# Patient Record
Sex: MALE | Race: WHITE | NOT HISPANIC OR LATINO | Employment: STUDENT | URBAN - METROPOLITAN AREA
[De-identification: names, ages, dates, MRNs, and addresses within clinical notes are randomized per-mention and may not be internally consistent; named-entity substitution may affect disease eponyms.]

---
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
## 2024-06-15 ENCOUNTER — HOSPITAL ENCOUNTER (EMERGENCY)
Facility: HOSPITAL | Age: 19
Discharge: HOME | End: 2024-06-15
Attending: EMERGENCY MEDICINE
Payer: COMMERCIAL

## 2024-06-15 VITALS
BODY MASS INDEX: 24.11 KG/M2 | HEART RATE: 96 BPM | SYSTOLIC BLOOD PRESSURE: 133 MMHG | TEMPERATURE: 98 F | RESPIRATION RATE: 16 BRPM | WEIGHT: 181.88 LBS | OXYGEN SATURATION: 99 % | HEIGHT: 73 IN | DIASTOLIC BLOOD PRESSURE: 85 MMHG

## 2024-06-15 DIAGNOSIS — Z23 NEED FOR TD VACCINE: ICD-10-CM

## 2024-06-15 DIAGNOSIS — S61.011A LACERATION OF RIGHT THUMB WITHOUT FOREIGN BODY WITHOUT DAMAGE TO NAIL, INITIAL ENCOUNTER: Primary | ICD-10-CM

## 2024-06-15 PROCEDURE — 12002 RPR S/N/AX/GEN/TRNK2.6-7.5CM: CPT

## 2024-06-15 PROCEDURE — 90715 TDAP VACCINE 7 YRS/> IM: CPT | Performed by: PHYSICIAN ASSISTANT

## 2024-06-15 PROCEDURE — 99284 EMERGENCY DEPT VISIT MOD MDM: CPT | Mod: 25

## 2024-06-15 PROCEDURE — 25000003 PHARM REV CODE 250: Performed by: PHYSICIAN ASSISTANT

## 2024-06-15 PROCEDURE — 90471 IMMUNIZATION ADMIN: CPT | Performed by: PHYSICIAN ASSISTANT

## 2024-06-15 PROCEDURE — 63600175 PHARM REV CODE 636 W HCPCS: Performed by: PHYSICIAN ASSISTANT

## 2024-06-15 RX ORDER — LIDOCAINE HYDROCHLORIDE 10 MG/ML
10 INJECTION INFILTRATION; PERINEURAL
Status: COMPLETED | OUTPATIENT
Start: 2024-06-15 | End: 2024-06-15

## 2024-06-15 RX ADMIN — LIDOCAINE HYDROCHLORIDE 10 ML: 10 INJECTION, SOLUTION INFILTRATION; PERINEURAL at 01:06

## 2024-06-15 RX ADMIN — BACITRACIN ZINC, NEOMYCIN, POLYMYXIN B 1 EACH: 400; 3.5; 5 OINTMENT TOPICAL at 02:06

## 2024-06-15 RX ADMIN — TETANUS TOXOID, REDUCED DIPHTHERIA TOXOID AND ACELLULAR PERTUSSIS VACCINE, ADSORBED 0.5 ML: 5; 2.5; 8; 8; 2.5 SUSPENSION INTRAMUSCULAR at 01:06

## 2024-06-15 NOTE — ED PROVIDER NOTES
Encounter Date: 6/15/2024       History     Chief Complaint   Patient presents with    thumb laceration     The patient is a 17 yo M. Hx of ADHD. Left hand dominant. He presented to the ER for an urgent evaluation c/o an acute laceration of his right thumb. He states that he broke a glass keya nac inadvertently causing the laceration. The injury occurred just prior to arrival. He denies any suspected retained glass FB. He denies any numbness or decreased ROM. He is accompanied by his mother who reports that he has not had a TD vaccine in more than 5 years. She states that she gave him 2 tablets of Tylenol for pain just PTA. No additional symptoms or concerns reported.        Review of patient's allergies indicates:  No Known Allergies  Past Medical History:   Diagnosis Date    ADHD      History reviewed. No pertinent surgical history.  No family history on file.  Social History     Tobacco Use    Smoking status: Never    Smokeless tobacco: Never   Substance Use Topics    Alcohol use: Never    Drug use: Never     Review of Systems   Respiratory:  Negative for shortness of breath.    Cardiovascular:  Negative for chest pain.   Gastrointestinal:  Negative for abdominal pain and nausea.   Musculoskeletal:  Negative for arthralgias and joint swelling.   Skin:  Positive for wound.   Allergic/Immunologic: Negative for immunocompromised state.   Neurological:  Negative for syncope, weakness and numbness.   Psychiatric/Behavioral:  The patient is nervous/anxious.        Physical Exam     Initial Vitals [06/15/24 1228]   BP Pulse Resp Temp SpO2   133/85 106 16 97.7 °F (36.5 °C) 98 %      MAP       --         Physical Exam    Nursing note and vitals reviewed.  Constitutional: He appears well-developed and well-nourished.   HENT:   Head: Atraumatic.   Eyes: Conjunctivae are normal.   Cardiovascular:  Normal rate and intact distal pulses.           Normal cap refill    Pulmonary/Chest: No respiratory distress.   Abdominal: He  "exhibits no distension.   Musculoskeletal:         General: Normal range of motion.      Comments: Acute superficial "V" shaped laceration to the volar aspect of the right thumb measuring approximately 4 cm in total length; no FB identified; normal ROM observed - doubt tendon injury or fracture; NV intact distal to wound. See image.      Neurological: He is alert and oriented to person, place, and time. He has normal strength. No sensory deficit. GCS score is 15. GCS eye subscore is 4. GCS verbal subscore is 5. GCS motor subscore is 6.   Skin: Skin is warm and dry.   Psychiatric: He has a normal mood and affect. His behavior is normal.   Nervous/anxious         ED Course   Lac Repair    Date/Time: 6/15/2024 1:32 PM    Performed by: Royal Li PA-C  Authorized by: Agus Benoit MD    Consent:     Consent obtained:  Verbal    Consent given by:  Patient and parent    Risks discussed:  Infection, need for additional repair, nerve damage, poor wound healing, poor cosmetic result, pain, retained foreign body, tendon damage and vascular damage    Alternatives discussed:  No treatment and referral  Anesthesia:     Anesthesia method:  Local infiltration    Local anesthetic:  Lidocaine 1% w/o epi  Laceration details:     Location:  Finger    Finger location:  R thumb    Length (cm):  4  Pre-procedure details:     Preparation:  Patient was prepped and draped in usual sterile fashion and imaging obtained to evaluate for foreign bodies  Exploration:     Imaging obtained: x-ray      Imaging outcome: foreign body not noted      Wound exploration: wound explored through full range of motion and entire depth of wound visualized      Wound extent: no foreign bodies/material noted, no muscle damage noted, no tendon damage noted, no underlying fracture noted and no vascular damage noted      Contaminated: no    Treatment:     Area cleansed with:  Povidone-iodine and saline    Amount of cleaning:  Extensive    " Irrigation solution:  Sterile saline  Skin repair:     Repair method:  Sutures    Suture size:  4-0    Suture material:  Nylon    Suture technique:  Simple interrupted and running    Number of sutures:  7  Approximation:     Approximation:  Close  Repair type:     Repair type:  Intermediate  Post-procedure details:     Dressing:  Antibiotic ointment and bulky dressing    Procedure completion:  Tolerated well, no immediate complications    Labs Reviewed - No data to display       Imaging Results              X-Ray Finger 2 or More Views Right (Final result)  Result time 06/15/24 14:58:40      Final result by Rodolfo Isaac MD (06/15/24 14:58:40)                   Impression:      No convincing evidence of acute fracture or dislocation.    No radiopaque foreign body.      Electronically signed by: Rodolfo Isaac  Date:    06/15/2024  Time:    14:58               Narrative:    EXAMINATION:  XR FINGER 2 OR MORE VIEWS RIGHT    CLINICAL HISTORY:  right thumb laceration;    TECHNIQUE:  Three views right thumb.    COMPARISON:  None    FINDINGS:  Presumed overlying bandage material somewhat limits evaluation of fine bony detail.    Noting this, no convincing evidence of acute displaced fracture or dislocation is seen.  Joint spaces appear maintained.    No definite radiopaque foreign body identified.    Remainder examination negative for acute change.                                       Medications   LIDOcaine HCL 10 mg/ml (1%) injection 10 mL (10 mLs Infiltration Given by Provider 6/15/24 1353)   neomycin-bacitracnZn-polymyxnB packet (1 each Topical (Top) Given 6/15/24 1405)   Tdap (BOOSTRIX) vaccine injection 0.5 mL (0.5 mLs Intramuscular Given 6/15/24 1350)     Medical Decision Making  Amount and/or Complexity of Data Reviewed  Radiology: ordered.    Risk  OTC drugs.  Prescription drug management.      Additional MDM:     X-Rays: Right thumb x ray was completed. My preliminary wet read - no fracture, dislocation,  "or FB identified.                       Medical Decision Making:   History:   I obtained history from: someone other than patient.       <> Summary of History: History obtained from the patient and his mother   Old Medical Records: I decided to obtain old medical records.  Old Records Summarized: other records.       <> Summary of Records: Chart review completed - outside immunization records verified, last TD in 2007   Initial Assessment:   19 yo M here for an acute laceration of his right thumb, "V" Shaped, approximately 4 cm in total length; NVI distal to wound, normal ROM w/o tendon injury   Differential Diagnosis:   Laceration, infection, NV injury, tendon injury, FB, etc   Clinical Tests:   Radiological Study: Ordered and Reviewed  ED Management:  Vital signs reviewed  Pt had Tylenol for pain just PTA   Td vaccine updated   Wound irrigated and explored - no FB   X ray completed - no Fx or FB identified   Wound cleaned with antiseptic   Wound repaired with sutures   Wound dressed with triple antibiotic ointment, sterile gauze 4 x 4, and loosely wrapped Coban - not too tight, pink warm fingertip with normal cap refill after coban dressing applied - cautioned regarding restrictive dressing   Wound care instructions provided - pt's mother is an RN at [facility] - comfortable with wound care   Tylenol/Ibuprofen advised for any pain   Follow up and return instructions provided                Clinical Impression:  Final diagnoses:  [S61.011A] Laceration of right thumb without foreign body without damage to nail, initial encounter (Primary)  [Z23] Need for Td vaccine          ED Disposition Condition    Discharge Stable          ED Prescriptions    None       Follow-up Information       Follow up With Specialties Details Why Contact Info    Adelaide Roy MD Pediatrics Schedule an appointment as soon as possible for a visit in 2 days For wound re-check [address]  [place] " [id number]  [phone]      [facility] Dept Emergency Medicine  If symptoms worsen [address]  [facility] [id number] [phone]             Royal Li, DAVID  06/15/24 1531       Royal Li PA-C  06/15/24 1539

## 2024-06-15 NOTE — ED NOTES
LOC: The patient is awake, alert and aware of environment with an appropriate affect  APPEARANCE: Patient resting comfortably and in no acute distress.  SKIN: The skin is warm and dry,with normal color.  RESPIRATORY: Airway is open and patent, respirations are spontaneous, patient has a normal effort and rate.ABDOMEN: Soft and non tender to palpation, no distention noted.  NEUROLOGIC: PERRL, facial expression is symmetrical.  MUSCULAR/SKELETAL: Moves all extremities, no obvious deformities noted.